# Patient Record
Sex: FEMALE
[De-identification: names, ages, dates, MRNs, and addresses within clinical notes are randomized per-mention and may not be internally consistent; named-entity substitution may affect disease eponyms.]

---

## 2018-08-08 ENCOUNTER — HOSPITAL ENCOUNTER (INPATIENT)
Dept: HOSPITAL 31 - C.ER | Age: 83
LOS: 2 days | Discharge: HOME | DRG: 296 | End: 2018-08-10
Attending: INTERNAL MEDICINE | Admitting: INTERNAL MEDICINE
Payer: MEDICAID

## 2018-08-08 DIAGNOSIS — I10: ICD-10-CM

## 2018-08-08 DIAGNOSIS — R73.03: ICD-10-CM

## 2018-08-08 DIAGNOSIS — D69.6: ICD-10-CM

## 2018-08-08 DIAGNOSIS — N20.2: ICD-10-CM

## 2018-08-08 DIAGNOSIS — E87.1: Primary | ICD-10-CM

## 2018-08-08 DIAGNOSIS — E78.5: ICD-10-CM

## 2018-08-08 DIAGNOSIS — Z87.891: ICD-10-CM

## 2018-08-08 DIAGNOSIS — H40.9: ICD-10-CM

## 2018-08-08 DIAGNOSIS — E86.1: ICD-10-CM

## 2018-08-08 LAB
BUN SERPL-MCNC: 25 MG/DL (ref 7–17)
CALCIUM SERPL-MCNC: 9.6 MG/DL (ref 8.6–10.4)
ERYTHROCYTE [DISTWIDTH] IN BLOOD BY AUTOMATED COUNT: 13.2 % (ref 11.5–14.5)
GFR NON-AFRICAN AMERICAN: 53
HGB BLD-MCNC: 11 G/DL (ref 11–16)
MCH RBC QN AUTO: 33.6 PG (ref 27–31)
MCHC RBC AUTO-ENTMCNC: 35.4 G/DL (ref 33–37)
MCV RBC AUTO: 94.9 FL (ref 81–99)
PLATELET # BLD: 138 K/UL (ref 130–400)
PMV BLD AUTO: 9.1 FL (ref 7.2–11.7)
RBC # BLD AUTO: 3.27 MIL/UL (ref 3.8–5.2)
WBC # BLD AUTO: 5.3 K/UL (ref 4.8–10.8)

## 2018-08-08 NOTE — CP.PCM.HP
<Kerry Mcduffie - Last Filed: 18 23:02>





History of Present Illness





- History of Present Illness


History of Present Illness: 


CC: Hyponatremia 





HPI: Patient is a 84 year old female with past medical history of 

thrombocytopenia, HTN and Pre-Diabetes who was sent to the ED by her hematology/

Oncologist for noted hyponatremia on recent blood work. As per patient, she has 

been experiencing symptoms of fatigue, headache and dizziness for the past 3-4 

weeks. In addition, patient also had 2 falls in the past 3 weeks due to 

dizziness and unsteady gait, with negative head imaging s/p fall. Furthermore, 

patient was treated for kidney stones with flomax, which stopped taking 

yesterday. Patient was able to pass kidney stones on her own 2 weeks ago. 

However, noted symptoms of dizziness and falls seems to be exacerbated with the 

addition of flomax to her anti-hypertensive medication. Patient's anti-

hypertensive dosage was increased about 2 months ago. During the encounter, 

patient denies any other symptoms. 





Code Status: Full code 


PMD: Dr. Lorenzo De Leon 


Cardiologist: Dr. Simón Thomas 


Hematology/Oncology: Dr. Soco Thomas 


PMHx: thrombocytopenia, HTN and Pre-Diabetes


PSHx: Appendectomy,  X1 and Ventral hernia repair 


Medications: Losartan-HCTZ 50-12.5mg PO daily, Meclizine 25mg PO BID, Crestor 

20mg PO HS and eye drops (Dorzolamide/Timolol, artificial and lanaprost)


Allergies: NKDA 





Social Hx: Lives with family. Former occasional tobacco use and denies ETOH and 

illicit drug use 








Present on Admission





- Present on Admission


Any Indicators Present on Admission: No





Review of Systems





- Constitutional


Constitutional: Fatigue.  absent: Chills, Fever, Headache, Increased Appetite, 

Sleep Apnea, Weakness





- EENT


Eyes: absent: Blurred Vision, Change in Vision


Ears: Dizziness





- Cardiovascular


Cardiovascular: Lightheadedness.  absent: Chest Pain, Chest Pain with Activity, 

Dyspnea, Dyspnea on Exertion, Palpitations





- Respiratory


Respiratory: absent: Dyspnea





- Gastrointestinal


Gastrointestinal: absent: Abdominal Pain, Diarrhea, Hematochezia, Nausea, 

Vomiting





- Reproductive: Female


Reproductive:Female: Menopausal





- Musculoskeletal


Musculoskeletal: absent: Muscle Weakness, Numbness, Tingling





- Neurological


Neurological: Dizziness.  absent: Confusion, Numbness, Headaches, Lack of 

Coordination, Tingling, Weakness





- Endocrine


Endocrine: Fatigue.  absent: Palpitations





Past Patient History





- Past Social History


Smoking Status: Never Smoked





- CARDIAC


Hx Hypertension: Yes





- NEUROLOGICAL


Hx Dizziness: Yes





- GENITOURINARY/GYNECOLOGICAL


Other/Comment: bladder problem





- PSYCHIATRIC


Hx Substance Use: No





- SURGICAL HISTORY


Hx Appendectomy: Yes





- ANESTHESIA


Hx Anesthesia: Yes


Hx Anesthesia Reactions: No


Hx Malignant Hyperthermia: No





Meds


Allergies/Adverse Reactions: 


 Allergies











Allergy/AdvReac Type Severity Reaction Status Date / Time


 


No Known Allergies Allergy   Verified 18 15:13














Physical Exam





- Constitutional


Appears: No Acute Distress





- Head Exam


Head Exam: ATRAUMATIC, NORMAL INSPECTION





- Eye Exam


Eye Exam: EOMI, Normal appearance, PERRL





- ENT Exam


ENT Exam: Mucous Membranes Moist





- Respiratory Exam


Respiratory Exam: Clear to Auscultation Bilateral, NORMAL BREATHING PATTERN.  

absent: Chest Wall Tenderness, Prolonged Expiratory Phase, Rhonchi, Wheezes, 

Respiratory Distress





- Cardiovascular Exam


Cardiovascular Exam: REGULAR RHYTHM, +S1, +S2.  absent: Diastolic murmur, 

Systolic Murmur





- GI/Abdominal Exam


GI & Abdominal Exam: Normal Bowel Sounds, Soft.  absent: Diminished Bowel Sounds

, Distended, Firm, Guarding, Hyperactive Bowel Sounds, Tenderness





- Extremities Exam


Extremities exam: Positive for: normal inspection.  Negative for: calf 

tenderness, pedal edema, tenderness





- Back Exam


Back exam: NORMAL INSPECTION.  absent: CVA tenderness (L), CVA tenderness (R)





- Neurological Exam


Neurological exam: Alert, CN II-XII Intact, Oriented x3, Reflexes Normal


Additional comments: 


+5/5 Strength UE/LE bilaterally 


+2/2 reflex UE/LE bilaterally 








- Psychiatric Exam


Psychiatric exam: Normal Affect





- Skin


Skin Exam: Normal Color





Results





- Vital Signs


Recent Vital Signs: 





 Last Vital Signs











Temp  98.4 F   18 20:48


 


Pulse  84   18 20:48


 


Resp  18   18 20:48


 


BP  110/56 L  18 20:48


 


Pulse Ox  100   18 20:48














- Labs


Result Diagrams: 


 18 18:29





 18 18:29


Labs: 





 Laboratory Results - last 24 hr











  18





  15:17 18:29 18:29


 


WBC   5.3 


 


RBC   3.27 L 


 


Hgb   11.0 


 


Hct   31.0 L 


 


MCV   94.9 


 


MCH   33.6 H 


 


MCHC   35.4 


 


RDW   13.2 


 


Plt Count   138 


 


MPV   9.1 


 


Sodium    119 L*


 


Potassium    4.6


 


Chloride    85 L


 


Carbon Dioxide    22


 


Anion Gap    16


 


BUN    25 H


 


Creatinine    1.0


 


Est GFR ( Amer)    > 60


 


Est GFR (Non-Af Amer)    53


 


POC Glucose (mg/dL)  121 H  


 


Random Glucose    104


 


Calcium    9.6














Assessment & Plan


(1) Hyponatremia


Assessment and Plan: 


Possibly secondary to thiazide diuretic use 


* Hold off anti-hypertensive medication 


* Patient seems to be euvolemic on exam 


* Will continue to monitor with labs


* F/u Cortisol, TSH/Free T4 and urine osmolality to rule out other causes 


Status: Acute   





(2) Dizziness on standing


Assessment and Plan: 


 Positive Orthostatic vital signs:


Layin/51


Sittin/54


Standin/41





Home medication: Losartan-HCTZ 50-12.5mg PO daily held due to normal BP


Continue home medication: Meclizine 25mg PO daily 


Midodrine 5mg PO once 


Will re-evaluate 








Status: Acute   





(3) History of hypertension


Assessment and Plan: 


Normotensive


Home medication: Losartan-HCTZ 50-12.5mg PO daily held due to normal BP, + 

orthostatic vitals and hyponatremia 


Will continue to monitor with vitals Q4H 


Status: Acute   





(4) History of prediabetes


Assessment and Plan: 


Diabetic diet 


Status: Acute   





(5) History of hyperlipidemia


Assessment and Plan: 


Crestor 20mg PO HS 


Status: Acute   





(6) Prophylactic measure


Assessment and Plan: 


GI: Not indicated 


DVT: SCDs and lovenox 30mg SC daily 








All plans and management discussed with Dr. carter 


Status: Acute   





<Valeriano Carter P - Last Filed: 18 01:04>





Results





- Vital Signs


Recent Vital Signs: 





 Last Vital Signs











Temp  98.2 F   18 00:22


 


Pulse  85   18 00:22


 


Resp  20   18 00:22


 


BP  135/75   18 00:22


 


Pulse Ox  99   18 00:22














- Labs


Result Diagrams: 


 18 18:29





 18 18:29


Labs: 





 Laboratory Results - last 24 hr











  18





  15:17 18:29 18:29


 


WBC   5.3 


 


RBC   3.27 L 


 


Hgb   11.0 


 


Hct   31.0 L 


 


MCV   94.9 


 


MCH   33.6 H 


 


MCHC   35.4 


 


RDW   13.2 


 


Plt Count   138 


 


MPV   9.1 


 


Sodium    119 L*


 


Potassium    4.6


 


Chloride    85 L


 


Carbon Dioxide    22


 


Anion Gap    16


 


BUN    25 H


 


Creatinine    1.0


 


Est GFR ( Amer)    > 60


 


Est GFR (Non-Af Amer)    53


 


POC Glucose (mg/dL)  121 H  


 


Random Glucose    104


 


Calcium    9.6


 


Free T4   


 


TSH 3rd Generation   


 


Plasma Cortisol PM   














  18 18 18





  18:29 18:29 22:26


 


WBC   


 


RBC   


 


Hgb   


 


Hct   


 


MCV   


 


MCH   


 


MCHC   


 


RDW   


 


Plt Count   


 


MPV   


 


Sodium   


 


Potassium   


 


Chloride   


 


Carbon Dioxide   


 


Anion Gap   


 


BUN   


 


Creatinine   


 


Est GFR ( Amer)   


 


Est GFR (Non-Af Amer)   


 


POC Glucose (mg/dL)   


 


Random Glucose   


 


Calcium   


 


Free T4    1.29


 


TSH 3rd Generation   2.13 


 


Plasma Cortisol PM  6.08  














Attending/Attestation





- Attestation


I have personally seen and examined this patient.: Yes


I have fully participated in the care of the patient.: Yes


I have reviewed all pertinent clinical information: Yes


Notes (Text): 





18 00:59


Assessment


Hyponatremia chronic, as significant symptoms most likely from HCTZ, dose 

increased since last 4 months, clinically euvolemic


Orthostatic hypotension for 1month due to simultaneous use of her htn meds ie 

losartan-hctz combo and last one month use of flomax used for ureteric calculus

, patient just stopped yesterday


Patient sent to hospital form pmd office being followed for chronic 

thrombcytopenia





Plan


Urine Osm, crotisol, tsh, since hyponatremia is chronic should not increase > 

10meq/day


Stop hctz


hold on losartan till bp improves


Midodrine one 5mg for orthostatic hypotension


Gi/DVT prophylaxis


See orders for detail.

## 2018-08-08 NOTE — C.PDOC
History Of Present Illness





<Ledy Long - Last Filed: 18 18:52>





<Jamir Aden - Last Filed: 18 20:24>


85 y/o female presents to ED sent by oncologist (Non [facility] provider) for 

evaluation of low sodium level noted. As per family patient has history of low 

platelets and went for regular platelet check up yesterday, today was called 

with results and instructed to come to ED for further evaluation. At ED 

contrary to triage patient denies headache, dizziness, excessive bleeding, 

petechia, fever, chills or any other complaints at this time.   (Ledy Long)


History Per: Patient


History/Exam Limitations: no limitations


Onset/Duration Of Symptoms: Days


Current Symptoms Are (Timing): Still Present





<Ledy Long - Last Filed: 18 18:52>





<Jamir Aden - Last Filed: 18 20:24>


Time Seen by Provider: 18 18:06


Chief Complaint (Nursing): Abnormal Labs





Past Medical History


Reviewed: Historical Data, Nursing Documentation, Vital Signs





- Medical History


PMH: HTN


Surgical History: Appendectomy


Family History: States: No Known Family Hx





- Social History


Hx Alcohol Use: No


Hx Substance Use: No





- Immunization History


Hx Tetanus Toxoid Vaccination: No


Hx Influenza Vaccination: No


Hx Pneumococcal Vaccination: No





<Ledy Long - Last Filed: 18 18:52>


Vital Signs: 


 Last Vital Signs











Temp  98.2 F   18 15:08


 


Pulse  64   18 15:08


 


Resp  18   18 15:08


 


BP  137/67   18 15:08


 


Pulse Ox  99   18 18:53














Review Of Systems


Constitutional: Negative for: Fever, Chills


Cardiovascular: Negative for: Chest Pain


Respiratory: Negative for: Cough, Shortness of Breath


Gastrointestinal: Negative for: Nausea, Vomiting


Skin: Negative for: Rash


Neurological: Negative for: Weakness, Numbness, Headache





<Ledy Long - Last Filed: 18 18:52>





Physical Exam





- Physical Exam


Appears: Non-toxic, No Acute Distress


Skin: Warm, Dry, No Rash


Head: Atraumatic, Normacephalic


Eye(s): bilateral: Normal Inspection


Oral Mucosa: Moist


Neck: Normal ROM, Supple


Cardiovascular: Rhythm Regular


Respiratory: Normal Breath Sounds, No Rales, No Rhonchi, No Wheezing


Gastrointestinal/Abdominal: Soft, No Tenderness, No Guarding, No Rebound


Extremity: Normal ROM, No Pedal Edema, Capillary Refill (<2 seconds)


Neurological/Psych: Oriented x3, Normal Speech, Normal Cognition





<Ledy Long - Last Filed: 18 18:52>





ED Course And Treatment





- Laboratory Results


Result Diagrams: 


 18 18:29





O2 Sat by Pulse Oximetry: 99 (RA)


Pulse Ox Interpretation: Normal





<EthanLedy - Last Filed: 18 18:52>





- Laboratory Results


Result Diagrams: 


 18 18:29





 08 18:29


ECG: Interpreted By Me, Viewed By Me


ECG Rhythm: Sinus Rhythm, ST/T Changes


ECG Interpretation: No Acute Changes, Abnormal


Interpretation Of ECG: NSR, ST-T abnormality, abnormal tracings.


Rate From EC





<Jamir Aden - Last Filed: 18 20:24>





Disposition





- Disposition


Disposition Time: 19:00





<EthanLedy - Last Filed: 18 18:52>


Discussed With : Valeriano Carter


Doctor Will See Patient In The: Hospital


Counseled Patient/Family Regarding: Diagnosis





- Disposition


Disposition Time: 19:36





- POA


Present On Arrival: None





<Jamir Aden - Last Filed: 18 20:24>





- Disposition


Disposition: HOSPITALIZED


Condition: STABLE





- Clinical Impression


Clinical Impression: 


 Abnormal laboratory test, Hyponatremia








- Scribe Statement


The provider has reviewed the documentation as recorded by the Scribe





<EthanLedy - Last Filed: 18 18:52>





<Jamir Aden - Last Filed: 18 20:24>





- Scribe Statement


Leola Chavira





All medical record entries made by the Scribe were at my direction and 

personally dictated by me. I have reviewed the chart and agree that the record 

accurately reflects my personal performance of the history, physical exam, 

medical decision making, and the department course for this patient. I have 

also personally directed, reviewed, and agree with the discharge instructions 

and disposition. (Ledy Long)





Physician Patient Turnover


Patient Signed Over To: Jamir Aden


Handoff Comments: FU LOWELL, DISPO





<Ledy Long - Last Filed: 18 18:52>

## 2018-08-09 VITALS — OXYGEN SATURATION: 99 %

## 2018-08-09 VITALS — RESPIRATION RATE: 20 BRPM

## 2018-08-09 LAB
ALBUMIN SERPL-MCNC: 4 G/DL (ref 3.5–5)
ALBUMIN/GLOB SERPL: 1.5 {RATIO} (ref 1–2.1)
ALT SERPL-CCNC: 26 U/L (ref 9–52)
AST SERPL-CCNC: 36 U/L (ref 14–36)
BASOPHILS # BLD AUTO: 0 K/UL (ref 0–0.2)
BASOPHILS NFR BLD: 0.6 % (ref 0–2)
BUN SERPL-MCNC: 21 MG/DL (ref 7–17)
BUN SERPL-MCNC: 23 MG/DL (ref 7–17)
BUN SERPL-MCNC: 23 MG/DL (ref 7–17)
CALCIUM SERPL-MCNC: 8.8 MG/DL (ref 8.6–10.4)
CALCIUM SERPL-MCNC: 9 MG/DL (ref 8.6–10.4)
CALCIUM SERPL-MCNC: 9.4 MG/DL (ref 8.6–10.4)
EOSINOPHIL # BLD AUTO: 0.2 K/UL (ref 0–0.7)
EOSINOPHIL NFR BLD: 4.9 % (ref 0–4)
ERYTHROCYTE [DISTWIDTH] IN BLOOD BY AUTOMATED COUNT: 12.7 % (ref 11.5–14.5)
GFR NON-AFRICAN AMERICAN: 60
HGB BLD-MCNC: 10.4 G/DL (ref 11–16)
LYMPHOCYTES # BLD AUTO: 0.9 K/UL (ref 1–4.3)
LYMPHOCYTES NFR BLD AUTO: 25.4 % (ref 20–40)
MCH RBC QN AUTO: 34.4 PG (ref 27–31)
MCHC RBC AUTO-ENTMCNC: 36 G/DL (ref 33–37)
MCV RBC AUTO: 95.5 FL (ref 81–99)
MONOCYTES # BLD: 0.4 K/UL (ref 0–0.8)
MONOCYTES NFR BLD: 11.4 % (ref 0–10)
NEUTROPHILS # BLD: 2.1 K/UL (ref 1.8–7)
NEUTROPHILS NFR BLD AUTO: 57.7 % (ref 50–75)
NRBC BLD AUTO-RTO: 0.1 % (ref 0–2)
OSMOLALITY,URINE: 161 MOSM/KG (ref 300–1000)
PLATELET # BLD: 119 K/UL (ref 130–400)
PMV BLD AUTO: 9.6 FL (ref 7.2–11.7)
RBC # BLD AUTO: 3.04 MIL/UL (ref 3.8–5.2)
WBC # BLD AUTO: 3.6 K/UL (ref 4.8–10.8)

## 2018-08-09 RX ADMIN — ENOXAPARIN SODIUM SCH MG: 30 INJECTION SUBCUTANEOUS at 11:00

## 2018-08-09 RX ADMIN — POLYVINYL ALCOHOL SCH DROP: 14 SOLUTION/ DROPS OPHTHALMIC at 22:05

## 2018-08-09 NOTE — CP.PCM.CON
Past Patient History





- Past Social History


Smoking Status: Never Smoked





- CARDIAC


Hx Hypertension: Yes





- NEUROLOGICAL


Hx Dizziness: Yes





- HEENT


Hx Glaucoma: Yes





- RENAL


Hx Chronic Kidney Disease: No


Hx Kidney Stones: Yes





- ENDOCRINE/METABOLIC


Hx Endocrine Disorders: No





- HEMATOLOGICAL/ONCOLOGICAL


Other/Comment: HX OF THROMBOCYTOPENIA





- MUSCULOSKELETAL/RHEUMATOLOGICAL


Hx Falls: Yes





- GASTROINTESTINAL


Hx Gastrointestinal Disorders: No





- GENITOURINARY/GYNECOLOGICAL


Other/Comment: bladder problem





- PSYCHIATRIC


Hx Substance Use: No





- SURGICAL HISTORY


Hx Surgeries: Yes


Hx Appendectomy: Yes


Hx  Section: Yes


Other/Comment: HX OF VENTRAL HERNIA REPAIR





- ANESTHESIA


Hx Anesthesia: Yes


Hx Anesthesia Reactions: No


Hx Malignant Hyperthermia: No





Meds


Allergies/Adverse Reactions: 


 Allergies











Allergy/AdvReac Type Severity Reaction Status Date / Time


 


No Known Allergies Allergy   Verified 18 15:13














- Medications


Medications: 


 Current Medications





Artificial Tears (Hypotears)  15 ml OD QID [facility]


Enoxaparin Sodium (Lovenox)  30 mg SC DAILY [facility]


   Last Admin: 18 11:00 Dose:  30 mg


Hydralazine HCl (Apresoline)  10 mg PO Q6 PRN


   PRN Reason: SBP>160


Sodium Chloride (Sodium Chloride 0.9%)  1,000 mls @ 50 mls/hr IV .Q20H [facility]


   Last Admin: 18 19:02 Dose:  50 mls/hr


Latanoprost (Xalatan Opht)  0 ml OD [facility]


   Last Admin: 18 21:34 Dose:  2.5 ml


Midodrine (Proamatine)  5 mg PO ONCE ONE


   Stop: 18 21:56


   Last Admin: 18 21:39 Dose:  5 mg


Pneumococcal Polyvalent Vaccine (Pneumovax 23 Vaccine)  0.5 ml IM .ONCE ONE


   Stop: 18 10:01


Rosuvastatin Calcium (Crestor)  20 mg PO [facility]


   Last Admin: 18 21:33 Dose:  20 mg


Timolol Maleate (Timoptic 0.25% Ophth Soln)  1 drop OD BID [facility]











Results





- Vital Signs


Recent Vital Signs: 


 Last Vital Signs











Temp  97.7 F   18 15:00


 


Pulse  72   18 15:00


 


Resp  20   18 15:00


 


BP  115/72   18 15:00


 


Pulse Ox  99   18 15:00














- Labs


Result Diagrams: 


 18 07:23





 18 16:32


Labs: 


 Laboratory Results - last 24 hr











  18





  15:17 18:29 18:29


 


WBC   


 


RBC   


 


Hgb   


 


Hct   


 


MCV   


 


MCH   


 


MCHC   


 


RDW   


 


Plt Count   


 


MPV   


 


Neut % (Auto)   


 


Lymph % (Auto)   


 


Mono % (Auto)   


 


Eos % (Auto)   


 


Baso % (Auto)   


 


Neut # (Auto)   


 


Lymph # (Auto)   


 


Mono # (Auto)   


 


Eos # (Auto)   


 


Baso # (Auto)   


 


Differential Comment   


 


Sodium   


 


Potassium   


 


Chloride   


 


Carbon Dioxide   


 


Anion Gap   


 


BUN   


 


Creatinine   


 


Est GFR ( Amer)   


 


Est GFR (Non-Af Amer)   


 


POC Glucose (mg/dL)  121 H  


 


Random Glucose   


 


Serum Osmolality   


 


Calcium   


 


Phosphorus   


 


Magnesium   


 


Total Bilirubin   


 


AST   


 


ALT   


 


Alkaline Phosphatase   


 


Total Protein   


 


Albumin   


 


Globulin   


 


Albumin/Globulin Ratio   


 


Free T4   


 


TSH 3rd Generation    2.13


 


Plasma Cortisol PM   6.08 


 


Urine Osmolality   














  18





  22:26 07:23 07:23


 


WBC   3.6 L 


 


RBC   3.04 L 


 


Hgb   10.4 L 


 


Hct   29.0 L 


 


MCV   95.5 


 


MCH   34.4 H 


 


MCHC   36.0 


 


RDW   12.7 


 


Plt Count   119 L 


 


MPV   9.6 


 


Neut % (Auto)   57.7 


 


Lymph % (Auto)   25.4 


 


Mono % (Auto)   11.4 H 


 


Eos % (Auto)   4.9 H 


 


Baso % (Auto)   0.6 


 


Neut # (Auto)   2.1 


 


Lymph # (Auto)   0.9 L 


 


Mono # (Auto)   0.4 


 


Eos # (Auto)   0.2 


 


Baso # (Auto)   0.0 


 


Differential Comment    


 


Sodium    126 L


 


Potassium    4.8


 


Chloride    92 L


 


Carbon Dioxide    23


 


Anion Gap    15


 


BUN    23 H


 


Creatinine    0.9


 


Est GFR ( Amer)    > 60


 


Est GFR (Non-Af Amer)    60


 


POC Glucose (mg/dL)   


 


Random Glucose    90


 


Serum Osmolality   


 


Calcium    9.4


 


Phosphorus    3.7


 


Magnesium    1.8


 


Total Bilirubin    0.7


 


AST    36


 


ALT    26


 


Alkaline Phosphatase    63


 


Total Protein    6.7


 


Albumin    4.0


 


Globulin    2.7


 


Albumin/Globulin Ratio    1.5


 


Free T4  1.29  


 


TSH 3rd Generation   


 


Plasma Cortisol PM   


 


Urine Osmolality   














  18





  08:24 13:55 16:32


 


WBC   


 


RBC   


 


Hgb   


 


Hct   


 


MCV   


 


MCH   


 


MCHC   


 


RDW   


 


Plt Count   


 


MPV   


 


Neut % (Auto)   


 


Lymph % (Auto)   


 


Mono % (Auto)   


 


Eos % (Auto)   


 


Baso % (Auto)   


 


Neut # (Auto)   


 


Lymph # (Auto)   


 


Mono # (Auto)   


 


Eos # (Auto)   


 


Baso # (Auto)   


 


Differential Comment   


 


Sodium    123 L


 


Potassium    3.8


 


Chloride    90 L


 


Carbon Dioxide    21 L


 


Anion Gap    16


 


BUN    21 H


 


Creatinine    0.9


 


Est GFR ( Amer)    > 60


 


Est GFR (Non-Af Amer)    60


 


POC Glucose (mg/dL)   


 


Random Glucose    191 H


 


Serum Osmolality   275 


 


Calcium    8.8


 


Phosphorus   


 


Magnesium   


 


Total Bilirubin   


 


AST   


 


ALT   


 


Alkaline Phosphatase   


 


Total Protein   


 


Albumin   


 


Globulin   


 


Albumin/Globulin Ratio   


 


Free T4   


 


TSH 3rd Generation   


 


Plasma Cortisol PM   


 


Urine Osmolality  180 L

## 2018-08-09 NOTE — RAD
Date of service: 



08/08/2018



HISTORY:

admission due to hyponatremia  



COMPARISON:

No prior.



TECHNIQUE:

Chest PA and lateral



FINDINGS:



LUNGS:

No consolidation.



Granulomatous changes



PLEURA:

No significant pleural effusion identified. No pneumothorax apparent.



CARDIOVASCULAR:

Normal.



OSSEOUS STRUCTURES:

Mid thoracic diffuse segmental  degenerative type wedging



Bilateral shoulder arthrosis.  Thoracic spondylosis



VISUALIZED UPPER ABDOMEN:

Normal.



OTHER FINDINGS:

None.



IMPRESSION:

No active disease. Other findings as above.

## 2018-08-09 NOTE — CP.PCM.PN
Subjective





- Date & Time of Evaluation


Date of Evaluation: 18


Time of Evaluation: 07:25





- Subjective


Subjective: 





Patient examined at bedside with son and other family members present.  Patient 

consented to discussing care in front of family.  Patient reports she is 

feeling better, however still experiences orthostatic hypotensive symptoms.  

Patient denies chest pain, SOB, abd pain, nausea, vomiting.  Pt denies any 

dysuria or hematuria and admits to having passed her kidney stone recently.





Objective





- Vital Signs/Intake and Output


Vital Signs (last 24 hours): 


 











Temp Pulse Resp BP Pulse Ox


 


 98.2 F   85   20   135/75   99 


 


 18 00:22  18 00:22  18 00:22  18 00:22  18 00:22








Intake and Output: 


 











 18





 06:59 18:59


 


Intake Total 1040 


 


Balance 1040 














- Medications


Medications: 


 Current Medications





Enoxaparin Sodium (Lovenox)  30 mg SC DAILY [facility]


Midodrine (Proamatine)  5 mg PO ONCE ONE


   Stop: 18 21:56


Pneumococcal Polyvalent Vaccine (Pneumovax 23 Vaccine)  0.5 ml IM .ONCE ONE


   Stop: 18 10:01


Rosuvastatin Calcium (Crestor)  20 mg PO HS [facility]











- Labs


Labs: 


 





 18 18:29 





 18 18:29 











- Constitutional


Appears: Non-toxic, No Acute Distress





- Head Exam


Head Exam: ATRAUMATIC, NORMAL INSPECTION, NORMOCEPHALIC





- Eye Exam


Eye Exam: EOMI, Normal appearance





- ENT Exam


ENT Exam: Mucous Membranes Moist





- Neck Exam


Neck Exam: Normal Inspection





- Respiratory Exam


Respiratory Exam: Clear to Ausculation Bilateral, NORMAL BREATHING PATTERN.  

absent: Rhonchi, Wheezes, Respiratory Distress





- Cardiovascular Exam


Cardiovascular Exam: REGULAR RHYTHM, +S1, +S2.  absent: Bradycardia, Tachycardia

, Murmur





- GI/Abdominal Exam


GI & Abdominal Exam: Soft, Normal Bowel Sounds.  absent: Tenderness





- Extremities Exam


Extremities Exam: Normal Capillary Refill, Normal Inspection.  absent: Calf 

Tenderness, Pedal Edema





- Neurological Exam


Neurological Exam: Alert, Awake, Oriented x3


Neuro motor strength exam: Left Upper Extremity: 5, Right Upper Extremity: 5, 

Left Lower Extremity: 5, Right Lower Extremity: 5





- Psychiatric Exam


Psychiatric exam: Normal Affect, Normal Mood





- Skin


Skin Exam: Intact, Normal Color, Warm.  absent: Dry





Assessment and Plan





- Assessment and Plan (Free Text)


Assessment: 





Hyponatremia


-admitted Na+ 119--->126--->123


-BMP Q6


-Na= not to increase more than .5/hr for a total of 12/day


-D5W 1L given to control sodium rise


-hold home losartan/HCTZ, 50/12.5


-pt d/c home flomax .4mg


-flomax and HCTZ most likely cause of hyponatremia


-nephro consult Dr. Shah





HTN


-held home losartan/HCTZ


-hydralazine 10mg Q6 PRN SBP>160


-orthostatics +


   Layin/51


   Sittin/54


   Standin/41





Thrombocytopenia


-platelets 138 on admission--->119


-continue to monitor





HLD


-crestor 20mg





Prediabetes


-diabetic diet





Ppx


-lovenox 30mg

## 2018-08-10 VITALS — HEART RATE: 74 BPM | SYSTOLIC BLOOD PRESSURE: 101 MMHG | TEMPERATURE: 97.8 F | DIASTOLIC BLOOD PRESSURE: 64 MMHG

## 2018-08-10 LAB
ALBUMIN SERPL-MCNC: 3.5 G/DL (ref 3.5–5)
ALBUMIN/GLOB SERPL: 1.4 {RATIO} (ref 1–2.1)
ALT SERPL-CCNC: 25 U/L (ref 9–52)
AST SERPL-CCNC: 27 U/L (ref 14–36)
BASOPHILS # BLD AUTO: 0 K/UL (ref 0–0.2)
BASOPHILS NFR BLD: 0.5 % (ref 0–2)
BUN SERPL-MCNC: 20 MG/DL (ref 7–17)
CALCIUM SERPL-MCNC: 8.9 MG/DL (ref 8.6–10.4)
EOSINOPHIL # BLD AUTO: 0.2 K/UL (ref 0–0.7)
EOSINOPHIL NFR BLD: 5.2 % (ref 0–4)
ERYTHROCYTE [DISTWIDTH] IN BLOOD BY AUTOMATED COUNT: 12.9 % (ref 11.5–14.5)
GFR NON-AFRICAN AMERICAN: > 60
HGB BLD-MCNC: 10 G/DL (ref 11–16)
LYMPHOCYTES # BLD AUTO: 0.9 K/UL (ref 1–4.3)
LYMPHOCYTES NFR BLD AUTO: 21.2 % (ref 20–40)
MCH RBC QN AUTO: 34.4 PG (ref 27–31)
MCHC RBC AUTO-ENTMCNC: 35.8 G/DL (ref 33–37)
MCV RBC AUTO: 96.1 FL (ref 81–99)
MONOCYTES # BLD: 0.4 K/UL (ref 0–0.8)
MONOCYTES NFR BLD: 10.3 % (ref 0–10)
NEUTROPHILS # BLD: 2.6 K/UL (ref 1.8–7)
NEUTROPHILS NFR BLD AUTO: 62.8 % (ref 50–75)
NRBC BLD AUTO-RTO: 0 % (ref 0–2)
PLATELET # BLD: 117 K/UL (ref 130–400)
PMV BLD AUTO: 9.5 FL (ref 7.2–11.7)
RBC # BLD AUTO: 2.9 MIL/UL (ref 3.8–5.2)
URATE SERPL-MCNC: 5.4 MG/DL (ref 2.2–7.5)
WBC # BLD AUTO: 4.1 K/UL (ref 4.8–10.8)

## 2018-08-10 RX ADMIN — TIMOLOL MALEATE SCH DROP: 2.5 SOLUTION OPHTHALMIC at 09:40

## 2018-08-10 RX ADMIN — POLYVINYL ALCOHOL SCH DROP: 14 SOLUTION/ DROPS OPHTHALMIC at 17:55

## 2018-08-10 RX ADMIN — ENOXAPARIN SODIUM SCH MG: 30 INJECTION SUBCUTANEOUS at 09:36

## 2018-08-10 RX ADMIN — POLYVINYL ALCOHOL SCH DROP: 14 SOLUTION/ DROPS OPHTHALMIC at 09:37

## 2018-08-10 RX ADMIN — TIMOLOL MALEATE SCH DROP: 2.5 SOLUTION OPHTHALMIC at 17:59

## 2018-08-10 RX ADMIN — POLYVINYL ALCOHOL SCH DROP: 14 SOLUTION/ DROPS OPHTHALMIC at 14:45

## 2018-08-10 NOTE — CP.PCM.PN
Subjective





- Date & Time of Evaluation


Date of Evaluation: 08/10/18


Time of Evaluation: 08:20





- Subjective


Subjective: 





Patient examined at bedside, no acute events overnight.  Patient reports no 

complaints, denies chest pain, SOB, abdominal pain, constipation and diarrhea.





Objective





- Vital Signs/Intake and Output


Vital Signs (last 24 hours): 


 











Temp Pulse Resp BP Pulse Ox


 


 97.7 F   69   20   110/65   99 


 


 08/10/18 00:00  08/10/18 00:00  08/10/18 00:00  08/10/18 00:00  08/10/18 00:00








Intake and Output: 


 











 08/09/18 08/10/18





 18:59 06:59


 


Intake Total 1480 450


 


Balance 1480 450














- Medications


Medications: 


 Current Medications





Artificial Tears (Hypotears)  15 ml OD QID [facility]


   Last Admin: 08/09/18 22:05 Dose:  1 drop


Enoxaparin Sodium (Lovenox)  30 mg SC DAILY [facility]


   Last Admin: 08/09/18 11:00 Dose:  30 mg


Hydralazine HCl (Apresoline)  10 mg PO Q6 PRN


   PRN Reason: SBP>160


Sodium Chloride (Sodium Chloride 0.9%)  1,000 mls @ 50 mls/hr IV .Q20H [facility]


   Last Admin: 08/09/18 19:02 Dose:  50 mls/hr


Latanoprost (Xalatan Opht)  0 ml OD [facility]


   Last Admin: 08/09/18 21:34 Dose:  2.5 ml


Pneumococcal Polyvalent Vaccine (Pneumovax 23 Vaccine)  0.5 ml IM .ONCE ONE


   Stop: 08/11/18 10:01


Rosuvastatin Calcium (Crestor)  20 mg PO [facility]


   Last Admin: 08/09/18 21:33 Dose:  20 mg


Timolol Maleate (Timoptic 0.25% Ophth Soln)  1 drop OD BID [facility]











- Labs


Labs: 


 





 08/09/18 07:23 





 08/09/18 22:21 











- Constitutional


Appears: Non-toxic, No Acute Distress





- Head Exam


Head Exam: ATRAUMATIC, NORMAL INSPECTION, NORMOCEPHALIC





- Eye Exam


Eye Exam: EOMI, Normal appearance





- ENT Exam


ENT Exam: Mucous Membranes Moist





- Neck Exam


Neck Exam: Normal Inspection





- Respiratory Exam


Respiratory Exam: Clear to Ausculation Bilateral, NORMAL BREATHING PATTERN.  

absent: Chest Wall Tenderness, Rhonchi, Wheezes





- Cardiovascular Exam


Cardiovascular Exam: REGULAR RHYTHM, +S1, +S2.  absent: Tachycardia, Murmur





- GI/Abdominal Exam


GI & Abdominal Exam: Soft, Normal Bowel Sounds.  absent: Distended, Tenderness

## 2018-08-10 NOTE — CP.PCM.DIS
Provider





- Provider


Date of Admission: 


08/08/18 19:37





Attending physician: 


Valeriano Carter MD





Primary care physician: 





Dr. De Leon


Consults: 





Dr. Shah, nephrology


Time Spent in preparation of Discharge (in minutes): 29





Diagnosis





- Discharge Diagnosis


(1) Hyponatremia


Status: Acute   


Comment: Patient presented with hyponatremia.  Home medication were discovered 

to be the source of electrolyte abnormality.  Sodium was corrected and patient 

is stable.  She is advised to follow up with her PMD and Dr. Shah within 1 

week of discharge   





(2) History of hypertension


Status: Acute   


Comment: Patients home medication of losartan-HCTZ 50/12.5 was found to be 

contributing to hyponatremia.  Offending agent was discontinued and patient 

will be taking Losartan 50mg PO Daily at 8am for blood pressure control   





(3) History of hyperlipidemia


Status: Acute   


Comment: Patient will continue Crestor 20mg PO daily at 8pm for lipid control   





Hospital Course





- Lab Results


Lab Results: 


 Most Recent Lab Values











WBC  4.1 K/uL (4.8-10.8)  L  08/10/18  06:36    


 


RBC  2.90 Mil/uL (3.80-5.20)  L  08/10/18  06:36    


 


Hgb  10.0 g/dL (11.0-16.0)  L  08/10/18  06:36    


 


Hct  27.9 % (34.0-47.0)  L  08/10/18  06:36    


 


MCV  96.1 fL (81.0-99.0)   08/10/18  06:36    


 


MCH  34.4 pg (27.0-31.0)  H  08/10/18  06:36    


 


MCHC  35.8 g/dL (33.0-37.0)   08/10/18  06:36    


 


RDW  12.9 % (11.5-14.5)   08/10/18  06:36    


 


Plt Count  117 K/uL (130-400)  L  08/10/18  06:36    


 


MPV  9.5 fL (7.2-11.7)   08/10/18  06:36    


 


Neut % (Auto)  62.8 % (50.0-75.0)   08/10/18  06:36    


 


Lymph % (Auto)  21.2 % (20.0-40.0)   08/10/18  06:36    


 


Mono % (Auto)  10.3 % (0.0-10.0)  H  08/10/18  06:36    


 


Eos % (Auto)  5.2 % (0.0-4.0)  H  08/10/18  06:36    


 


Baso % (Auto)  0.5 % (0.0-2.0)   08/10/18  06:36    


 


Neut # (Auto)  2.6 K/uL (1.8-7.0)   08/10/18  06:36    


 


Lymph # (Auto)  0.9 K/uL (1.0-4.3)  L  08/10/18  06:36    


 


Mono # (Auto)  0.4 K/uL (0.0-0.8)   08/10/18  06:36    


 


Eos # (Auto)  0.2 K/uL (0.0-0.7)   08/10/18  06:36    


 


Baso # (Auto)  0.0 K/uL (0.0-0.2)   08/10/18  06:36    


 


Differential Comment     08/09/18  07:23    


 


Sodium  127 mmol/L (132-148)  L  08/10/18  06:36    


 


Potassium  4.3 mmol/L (3.6-5.2)   08/10/18  06:36    


 


Chloride  96 mmol/L ()  L  08/10/18  06:36    


 


Carbon Dioxide  20 mmol/L (22-30)  L  08/10/18  06:36    


 


Anion Gap  15  (10-20)   08/10/18  06:36    


 


BUN  20 mg/dL (7-17)  H  08/10/18  06:36    


 


Creatinine  0.8 mg/dL (0.7-1.2)   08/10/18  06:36    


 


Est GFR ( Amer)  > 60   08/10/18  06:36    


 


Est GFR (Non-Af Amer)  > 60   08/10/18  06:36    


 


POC Glucose (mg/dL)  121 mg/dL ()  H  08/08/18  15:17    


 


Random Glucose  88 mg/dL ()   08/10/18  06:36    


 


Serum Osmolality  275 mosm/kg (272-300)   08/09/18  13:55    


 


Uric Acid  5.4 mg/dL (2.2-7.5)   08/10/18  06:36    


 


Calcium  8.9 mg/dl (8.6-10.4)   08/10/18  06:36    


 


Phosphorus  3.3 mg/dL (2.5-4.5)   08/10/18  06:36    


 


Magnesium  1.5 mg/dL (1.6-2.3)  L  08/10/18  06:36    


 


Total Bilirubin  0.5 mg/dL (0.2-1.3)   08/10/18  06:36    


 


AST  27 U/L (14-36)   08/10/18  06:36    


 


ALT  25 U/L (9-52)   08/10/18  06:36    


 


Alkaline Phosphatase  68 U/L ()   08/10/18  06:36    


 


Total Protein  6.0 g/dL (6.3-8.3)  L  08/10/18  06:36    


 


Albumin  3.5 g/dL (3.5-5.0)   08/10/18  06:36    


 


Globulin  2.5 gm/dL (2.2-3.9)   08/10/18  06:36    


 


Albumin/Globulin Ratio  1.4  (1.0-2.1)   08/10/18  06:36    


 


Free T4  1.29 ng/dL (0.78-2.19)   08/08/18  22:26    


 


TSH 3rd Generation  2.13 mIU/L (0.46-4.68)   08/08/18  18:29    


 


Plasma Cortisol PM  6.08 ug/dL (1.7-14.1)   08/08/18  18:29    


 


Urine Osmolality  161 mosm/kg (300-1000)  L  08/09/18  22:21    


 


Ur Random Sodium  39 mmol/L  08/09/18  22:21    














- Hospital Course


Hospital Course: 





Patient was treated at [facility] from 8/8/18-8/10/18.  Patient presented 

with hyponatremia and found by her PMD Dr. De Leon, and reports of dizziness, 

and was sent in for evaluation.  CBC, CMP, urine studies, EKG and chest x-ray 

were performed.  Although sodium was low, all other studies were unremarkable.  

Serial CMPs Q6 were performed to ensure appropriate rise in sodium over time.  

Patient is advised to discontinue Flomax and Losartan-HCTZ at home as these 

were found to be the offending agents.  Patient's blood pressure will be 

controlled with Losartan 50mg PO daily @ 8am, until further management by her 

physician after discahrge.  Dr. Shah, nephrologist was consulted to assist.  

Patient is currently stable to return home.  It is advised patient return to ED 

with worsening symptoms.  Patient is to follow up with Dr. De Leon within 1 

week of discharge, and Dr. Shah within 1 week of discharge.





HPI on admission


"Patient is a 84 year old female with past medical history of thrombocytopenia, 

HTN and Pre-Diabetes who was sent to the ED by her hematology/Oncologist for 

noted hyponatremia on recent blood work. As per patient, she has been 

experiencing symptoms of fatigue, headache and dizziness for the past 3-4 

weeks. In addition, patient also had 2 falls in the past 3 weeks due to 

dizziness and unsteady gait, with negative head imaging s/p fall. Furthermore, 

patient was treated for kidney stones with flomax, which stopped taking 

yesterday. Patient was able to pass kidney stones on her own 2 weeks ago. 

However, noted symptoms of dizziness and falls seems to be exacerbated with the 

addition of flomax to her anti-hypertensive medication. Patient's anti-

hypertensive dosage was increased about 2 months ago. During the encounter, 

patient denies any other symptoms." 





Discharge Exam





- Head Exam


Head Exam: ATRAUMATIC, NORMAL INSPECTION, NORMOCEPHALIC





- Eye Exam


Eye Exam: EOMI, Normal appearance


Pupil Exam: NORMAL ACCOMODATION





- ENT Exam


ENT Exam: Mucous Membranes Moist





- Respiratory Exam


Respiratory Exam: NORMAL BREATHING PATTERN, UNREMARKABLE.  absent: Rhonchi, 

Wheezes





- Cardiovascular Exam


Cardiovascular Exam: REGULAR RHYTHM, +S1.  absent: Bradycardia, Tachycardia, 

Rubs, Systolic Murmur





- GI/Abdominal Exam


GI & Abdominal Exam: Normal Bowel Sounds, Soft, Unremarkable.  absent: 

Tenderness





- Extremities Exam


Extremities exam: normal capillary refill, normal inspection, pedal pulses 

present





- Neurological Exam


Neurological exam: Alert, Oriented x3





- Psychiatric Exam


Psychiatric exam: Normal Affect, Normal Mood





- Skin


Skin Exam: Intact, Normal Color, Warm





Discharge Plan





- Discharge Medications


Prescriptions: 


Dorzolamide HCl/Timolol Maleat [Dorzolamide-Timolol Eye Drops] 1 drop OD BID #1 

drops


Latanoprost 0.005% Opht [Xalatan Opht] 1 drop OD HS #1 bottle


Meclizine [Meclizine*] 25 mg PO Q6 PRN #1 tab


 PRN Reason: Dizziness


Polyvinyl Alcohol 1 drop OD QID #1 drops


Rosuvastatin Calcium [Crestor] 20 mg PO DAILY #1 tablet





- Follow Up Plan


Condition: STABLE


Disposition: HOME/ ROUTINE


Additional Instructions: 


Patient is stable to be discharged home.  Patient is to discontinue home meds 

of Losartan-Hydrochlorothiazide and Flomax.  Patient is only to take 

medications prescribed to her at this time.  Patient is to stop at pharmacy on 

the way home from hospital to fill prescriptions, and take only those.  Patient 

is advised to follow up with her primary medical doctor within 1 week of 

discharge.  Patient is advised to follow up with nephrologist Dr. Shah within 

1 week after discharge for continued care.  Patient will be given a 

prescription for a walker to use at home.


Patient is to return to Emergency Department with any worsening of symptoms.


Referrals: 


Lizandro Shah MD [Staff Provider] -

## 2018-08-10 NOTE — CP.PCM.PN
Subjective





- Date & Time of Evaluation


Date of Evaluation: 08/10/18


Time of Evaluation: 06:33





- Subjective


Subjective: 





Nephrology Progress Note for SILVANA Lovelace PGY3





Patient seen and examined at bedside. As per nursing staff, there were no acute 

overnight events. Patient slept well overnight. She denies chest pain, 

shortness of breath, nausea/vomiting/diarrhea, fever/chills, numbness/tingling, 

dysuria or hematuria. 





Objective





- Vital Signs/Intake and Output


Vital Signs (last 24 hours): 


 











Temp Pulse Resp BP Pulse Ox


 


 97.7 F   69   20   110/65   99 


 


 08/10/18 00:00  08/10/18 00:00  08/10/18 00:00  08/10/18 00:00  08/10/18 00:00








Intake and Output: 


 











 08/09/18 08/10/18





 18:59 06:59


 


Intake Total 1480 450


 


Balance 1480 450














- Medications


Medications: 


 Current Medications





Artificial Tears (Hypotears)  15 ml OD QID [facility]


   Last Admin: 08/09/18 22:05 Dose:  1 drop


Enoxaparin Sodium (Lovenox)  30 mg SC DAILY [facility]


   Last Admin: 08/09/18 11:00 Dose:  30 mg


Hydralazine HCl (Apresoline)  10 mg PO Q6 PRN


   PRN Reason: SBP>160


Sodium Chloride (Sodium Chloride 0.9%)  1,000 mls @ 50 mls/hr IV .Q20H [facility]


   Last Admin: 08/09/18 19:02 Dose:  50 mls/hr


Latanoprost (Xalatan Opht)  0 ml OD [facility]


   Last Admin: 08/09/18 21:34 Dose:  2.5 ml


Pneumococcal Polyvalent Vaccine (Pneumovax 23 Vaccine)  0.5 ml IM .ONCE ONE


   Stop: 08/11/18 10:01


Rosuvastatin Calcium (Crestor)  20 mg PO [facility]


   Last Admin: 08/09/18 21:33 Dose:  20 mg


Timolol Maleate (Timoptic 0.25% Ophth Soln)  1 drop OD BID [facility]











- Labs


Labs: 


 





 08/09/18 07:23 





 08/09/18 22:21 











- Constitutional


Appears: No Acute Distress





- Head Exam


Head Exam: ATRAUMATIC, NORMAL INSPECTION, NORMOCEPHALIC





- Eye Exam


Eye Exam: Normal appearance, PERRL


Pupil Exam: NORMAL ACCOMODATION, PERRL





- ENT Exam


ENT Exam: Mucous Membranes Moist





- Neck Exam


Neck Exam: Full ROM





- Respiratory Exam


Respiratory Exam: Clear to Ausculation Bilateral, NORMAL BREATHING PATTERN.  

absent: Rales, Rhonchi, Wheezes





- Cardiovascular Exam


Cardiovascular Exam: REGULAR RHYTHM, +S1, +S2.  absent: Gallop, Rubs, Murmur





- GI/Abdominal Exam


GI & Abdominal Exam: Soft, Normal Bowel Sounds.  absent: Rigid, Tenderness, Mass

, Rebound





- Extremities Exam


Extremities Exam: Normal Inspection.  absent: Calf Tenderness, Pedal Edema





- Neurological Exam


Neurological Exam: Alert, Awake, CN II-XII Intact





- Skin


Skin Exam: Dry, Intact, Warm





Assessment and Plan





- Assessment and Plan (Free Text)


Assessment: 





This is an 83yo female with past medical history of HTN, dyslipidemia and 

thrombocytopenia who was admitted for 





1. Hyponatremia 


   - Most likely hypovolemia ( + orthostatics) with low solute intake 


   - NS@50


   - Correction rate of 6-8 mEq/d to prevent osmotic demyelination syndrome - 

if corrects too quickly switch to D5W 





2. HTN 


   - BP in 110s


   - Hold anti-hypertensives for now 


   - Continue NS@50 





3. Dyslipidemia 


   - Continue Crestor 





4. Nephrolithiasis


   - Patient reports already passing stone 


   - denies any symptoms of acute kidney stone


   - encourage PO intake once hyponatremia has resolved


   - Goal PO intake at home would be enough to produce 2-2.5 L of urine to 

prevent further stones. 





Case seen, discussed and reviewed with Dr. Shah. 


SILVANA Redman PGY3

## 2018-08-10 NOTE — CON
Copied To:  Lizandro Shah MD

Attending MD:  Lizandro Shah MD



DATE:  08/09/2018



NEPHROLOGY CONSULTATION



LOCATION:  [facility].



HISTORY OF PRESENT ILLNESS:  The patient is an 84-year-old female with past

medical history of hypertension, prediabetes and thrombocytopenia, sent by

her hematologist for hyponatremia on recent blood work.  Nephrology is

being consulted for the same.



The patient reports currently feeling well.  On presentation, she had been

complaining of fatigue, headache, and dizziness over the past few weeks. 

Also reportedly had falls two times due to her dizziness and unsteady gait.

The patient otherwise denies any nausea, vomiting, or diarrhea.  Reports

that her appetite is well and had been eating well.  Reports that her p.o.

water intake is about three to four 500-mL bottles of water daily.



The patient also reportedly had kidney stones recently and was placed on

Flomax, which was worsening her dizziness.



PAST MEDICAL HISTORY:  As above.



SOCIAL HISTORY:  Smoked many years ago.



FAMILY HISTORY:  Denies per the patient.



REVIEW OF SYSTEMS:

CONSTITUTIONAL:  Denies any fevers, chills, or night sweats.

HEENT:  Denies any upper respiratory symptoms.

RESPIRATORY:  Denies any dyspnea.

CARDIOVASCULAR:  No chest pain or palpitations.

GASTROINTESTINAL:  As per HPI.

GENITOURINARY:  No change in urination.

MUSCULOSKELETAL:  Reports chronic knee and lower leg pain.  Does not take

any pain medication.

SKIN:  Denies any pruritus.

NEUROLOGIC:  As per HPI.



PHYSICAL EXAMINATION:

VITAL SIGNS:  This afternoon, blood pressure 115/72, heart rate 72,

respirations 20, temperature 97.7, O2 saturation 99% on room air.

GENERAL:  No distress.  Conversing coherently in full sentences.

HEENT:  Moist mucous membranes.  Nonicteric.  No cervical lymphadenopathy.

RESPIRATORY:  Lungs are clear to auscultation bilaterally.  No rales.  No

rhonchi.  No wheezes.

CARDIOVASCULAR:  Heart sounds, S1 and S2 normal.  No murmurs.  No gallops. 

No rubs.

GASTROINTESTINAL:  Abdomen is soft, nontender and nondistended.

GENITOURINARY:  No bladder distention.

EXTREMITIES:  No lower leg edema.

SKIN:  Warm.  No cyanosis.

PSYCHIATRIC:  Normal mood.  Normal affect.

NEUROLOGIC:  No significant tremor at rest.



LABORATORY DATA:  This morning:  CBC:  WBC 3.6, hemoglobin 10.4, hematocrit

29, platelets 119.



Chemistry Panel:  Sodium 126, increased from 119 yesterday evening;

potassium 4.8; chloride 92; bicarbonate 23; BUN 23; creatinine 0.9; glucose

90; calcium 9.4; phosphorus 3.7; magnesium 1.8.  AST 36, ALT 26, albumin 4.

TSH 2.13.  Urine osmolality 180.



Chest x-ray directly visualized, lungs appear clear.



ASSESSMENT AND PLAN:

1.  Hyponatremia, relatively severe.  The patient with orthostatic symptoms

per history although has been normotensive while here.  Initial urine

osmolality is not available.  Urine osmolality after receiving intravenous

fluids overnight is relatively low.  Most likely, etiology is secondary to

volume depletion, however, repeat.  There is a possibility of inadequate

solid intake, which would have been diagnosed _____ had low urine

osmolality on initial presentation.

a.  D5W one liter bolus given earlier today to reverse rapid correction of

hyponatremia.

b.  Plan, we will keep the patient on gentle intravenous fluids with normal

saline at 50 mL per hour.  Given that her urine osmolality remains

relatively significantly lower than osmolality of normal saline, serum

sodium should continue to increase.

c.  Goal rate of correction should be no more than 6 to 8 mEq serum sodium

in 24 hours.

2.  Hypertension.  Blood pressure has been controlled, off antihypertensive

medications currently.  Recommend to continue holding antihypertensive

medications.

3.  Nephrolithiasis.  The patient reports recent history of passing kidney

stone.  Perhaps, this is why she was drinking relatively increased amounts

of water.  Once hyponatremia is corrected and the patient is having

adequate p.o. solid intake, she should go back to drinking enough water to

produce 2 to 2.5 liters of urine daily in order to prevent further stone

formation.



Thank you for this referral.  We will be following up closely.





__________________________________________

Lizandro Shah MD





DD:  08/09/2018 23:06:24

DT:  08/10/2018 1:43:21

Job # [id number]

## 2018-08-12 NOTE — CARD
--------------- APPROVED REPORT --------------





Date of service: 08/08/2018



EKG Measurement

Heart [id number]

VA [id number]

[id number]

[id number]

[id number]



<Conclusion>

Normal sinus rhythm

ST & T wave abnormality, consider inferior ischemia

ST & T wave abnormality, consider anterolateral ischemia

Abnormal ECG